# Patient Record
Sex: FEMALE | Race: ASIAN | NOT HISPANIC OR LATINO
[De-identification: names, ages, dates, MRNs, and addresses within clinical notes are randomized per-mention and may not be internally consistent; named-entity substitution may affect disease eponyms.]

---

## 2017-09-06 ENCOUNTER — FORM ENCOUNTER (OUTPATIENT)
Age: 47
End: 2017-09-06

## 2018-05-08 ENCOUNTER — FORM ENCOUNTER (OUTPATIENT)
Age: 48
End: 2018-05-08

## 2018-09-04 ENCOUNTER — FORM ENCOUNTER (OUTPATIENT)
Age: 48
End: 2018-09-04

## 2019-06-18 ENCOUNTER — FORM ENCOUNTER (OUTPATIENT)
Age: 49
End: 2019-06-18

## 2019-09-02 ENCOUNTER — FORM ENCOUNTER (OUTPATIENT)
Age: 49
End: 2019-09-02

## 2019-12-17 ENCOUNTER — FORM ENCOUNTER (OUTPATIENT)
Age: 49
End: 2019-12-17

## 2020-09-03 ENCOUNTER — FORM ENCOUNTER (OUTPATIENT)
Age: 50
End: 2020-09-03

## 2021-03-12 ENCOUNTER — APPOINTMENT (OUTPATIENT)
Dept: BREAST CENTER | Facility: CLINIC | Age: 51
End: 2021-03-12

## 2021-03-12 ENCOUNTER — NON-APPOINTMENT (OUTPATIENT)
Age: 51
End: 2021-03-12

## 2021-03-12 PROBLEM — Z00.00 ENCOUNTER FOR PREVENTIVE HEALTH EXAMINATION: Status: ACTIVE | Noted: 2021-03-12

## 2021-09-09 ENCOUNTER — NON-APPOINTMENT (OUTPATIENT)
Age: 51
End: 2021-09-09

## 2021-09-09 ENCOUNTER — APPOINTMENT (OUTPATIENT)
Dept: BREAST CENTER | Facility: CLINIC | Age: 51
End: 2021-09-09
Payer: COMMERCIAL

## 2021-09-09 VITALS
SYSTOLIC BLOOD PRESSURE: 121 MMHG | BODY MASS INDEX: 24.45 KG/M2 | WEIGHT: 138 LBS | DIASTOLIC BLOOD PRESSURE: 88 MMHG | HEIGHT: 63 IN

## 2021-09-09 DIAGNOSIS — N63.0 UNSPECIFIED LUMP IN UNSPECIFIED BREAST: ICD-10-CM

## 2021-09-09 PROCEDURE — 99213 OFFICE O/P EST LOW 20 MIN: CPT

## 2021-09-10 ENCOUNTER — TRANSCRIPTION ENCOUNTER (OUTPATIENT)
Age: 51
End: 2021-09-10

## 2022-03-21 ENCOUNTER — APPOINTMENT (OUTPATIENT)
Dept: BREAST CENTER | Facility: CLINIC | Age: 52
End: 2022-03-21
Payer: COMMERCIAL

## 2022-03-21 ENCOUNTER — NON-APPOINTMENT (OUTPATIENT)
Age: 52
End: 2022-03-21

## 2022-03-21 VITALS
WEIGHT: 142 LBS | BODY MASS INDEX: 25.16 KG/M2 | SYSTOLIC BLOOD PRESSURE: 124 MMHG | HEIGHT: 63 IN | HEART RATE: 86 BPM | DIASTOLIC BLOOD PRESSURE: 83 MMHG

## 2022-03-21 DIAGNOSIS — R92.8 OTHER ABNORMAL AND INCONCLUSIVE FINDINGS ON DIAGNOSTIC IMAGING OF BREAST: ICD-10-CM

## 2022-03-21 DIAGNOSIS — Z85.3 PERSONAL HISTORY OF MALIGNANT NEOPLASM OF BREAST: ICD-10-CM

## 2022-03-21 DIAGNOSIS — Z78.9 OTHER SPECIFIED HEALTH STATUS: ICD-10-CM

## 2022-03-21 PROCEDURE — 99214 OFFICE O/P EST MOD 30 MIN: CPT

## 2022-04-04 ENCOUNTER — NON-APPOINTMENT (OUTPATIENT)
Age: 52
End: 2022-04-04

## 2022-12-01 ENCOUNTER — NON-APPOINTMENT (OUTPATIENT)
Age: 52
End: 2022-12-01

## 2022-12-07 ENCOUNTER — NON-APPOINTMENT (OUTPATIENT)
Age: 52
End: 2022-12-07

## 2022-12-07 ENCOUNTER — APPOINTMENT (OUTPATIENT)
Dept: BREAST CENTER | Facility: CLINIC | Age: 52
End: 2022-12-07

## 2022-12-07 VITALS
DIASTOLIC BLOOD PRESSURE: 89 MMHG | HEIGHT: 63 IN | BODY MASS INDEX: 24.98 KG/M2 | SYSTOLIC BLOOD PRESSURE: 137 MMHG | HEART RATE: 108 BPM | WEIGHT: 141 LBS

## 2022-12-07 DIAGNOSIS — E11.9 TYPE 2 DIABETES MELLITUS W/OUT COMPLICATIONS: ICD-10-CM

## 2022-12-07 DIAGNOSIS — R92.2 INCONCLUSIVE MAMMOGRAM: ICD-10-CM

## 2022-12-07 PROCEDURE — 99214 OFFICE O/P EST MOD 30 MIN: CPT

## 2022-12-07 RX ORDER — METFORMIN ER 500 MG 500 MG/1
500 TABLET ORAL
Refills: 0 | Status: ACTIVE | COMMUNITY

## 2022-12-07 NOTE — HISTORY OF PRESENT ILLNESS
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 Fhx of breast cancer in sister, (dx age 38 and BRCA +). Patient is BRCA 1/2 negative (tested in 2011). Patient has not had updated genetic testing, she has declined at this time. Patient was due for MRI in 9/2022;however, declines proceeding with MRI's at this time. Patient denies palpable masses, skin changes, or nipple discharge bilaterally. \par  \par 9/7/17: B/l MG & US- scattered fibroglandular densities\par 9/15/18: B/l MG & US- no evidence of disease\par 9/3/19: B/l MG & US- merna Microcalc, stable L US masses\par 9/4/20: B/l MG & US- benign left breast masses and cysts, new 0.3 cm nodule retroareolar. Rec 6m f/u\par 3/12/21: L US- 0.3cm RA hypoechoic nodule, stable. BIRADS 3. \par 9/9/21: B/l MG & US-  Dense. L tissue marker. R postsurgical changes. US-  L 0.5cm 12:00, 5FN oval mass. L 0.3cm 2:00, 7FN hypoechoic mass. L RA 0.4cm hypoechoic mass like finding, possible duct w/ debris and unchanged in 2020. (Rec. 6 mo f/u). BIRADS 3. \par 3/21/22: MRI- R RANJITH. L 2.8cm clumped NME retroareolar 8:30 (rec MR bx). BI-RADS 4\par 3/21/22: L US- Stable 0.4cm hypoechoic nodule w/o significant change. Rec 6m f/u at time of annual imaging\par 3/30/22: L MR bx x3- SITE 1) L 7:00 retroareolar (infinity clip)-  benign breast tissue with sclerosing adenosis and usual ductal hyperplasia with rare calcifications. SITE 2) L 8:00 (buckle clip)- benign breast tissue with fibrocystic changes and sclerosing adenosis with calcification. SITE 3) L 7:00 posterior (cork clip)- benign breast tissue with sclerosing adenosis. Concordant. \par 9/21/22: B/l MG & US- heterogenously dense. US- L stable mass and cysts. BI-RADS 2

## 2022-12-07 NOTE — PHYSICAL EXAM
[de-identified] : R breast/axilla/supraclavicular area: No evidence of recurrence\par  [de-identified] : L breast/axilla/supraclavicular area: No masses, discharge, or adenopathy\par

## 2023-10-01 ENCOUNTER — NON-APPOINTMENT (OUTPATIENT)
Age: 53
End: 2023-10-01

## 2023-10-02 ENCOUNTER — NON-APPOINTMENT (OUTPATIENT)
Age: 53
End: 2023-10-02

## 2023-10-02 ENCOUNTER — APPOINTMENT (OUTPATIENT)
Dept: BREAST CENTER | Facility: CLINIC | Age: 53
End: 2023-10-02
Payer: COMMERCIAL

## 2023-10-02 VITALS
HEIGHT: 63 IN | HEART RATE: 88 BPM | SYSTOLIC BLOOD PRESSURE: 127 MMHG | DIASTOLIC BLOOD PRESSURE: 84 MMHG | BODY MASS INDEX: 23.57 KG/M2 | WEIGHT: 133 LBS

## 2023-10-02 DIAGNOSIS — Z80.3 FAMILY HISTORY OF MALIGNANT NEOPLASM OF BREAST: ICD-10-CM

## 2023-10-02 DIAGNOSIS — Z85.3 ENCOUNTER FOR FOLLOW-UP EXAMINATION AFTER COMPLETED TREATMENT FOR MALIGNANT NEOPLASM: ICD-10-CM

## 2023-10-02 DIAGNOSIS — Z08 ENCOUNTER FOR FOLLOW-UP EXAMINATION AFTER COMPLETED TREATMENT FOR MALIGNANT NEOPLASM: ICD-10-CM

## 2023-10-02 DIAGNOSIS — Z85.3 PERSONAL HISTORY OF MALIGNANT NEOPLASM OF BREAST: ICD-10-CM

## 2023-10-02 DIAGNOSIS — Z78.9 OTHER SPECIFIED HEALTH STATUS: ICD-10-CM

## 2023-10-02 PROCEDURE — 99213 OFFICE O/P EST LOW 20 MIN: CPT

## 2024-09-29 ENCOUNTER — NON-APPOINTMENT (OUTPATIENT)
Age: 54
End: 2024-09-29

## 2024-09-30 NOTE — PHYSICAL EXAM
[Normocephalic] : normocephalic [EOMI] : extra ocular movement intact [Supple] : supple [No Supraclavicular Adenopathy] : no supraclavicular adenopathy [No Cervical Adenopathy] : no cervical adenopathy [de-identified] : L breast/axilla/supraclavicular area: No masses, discharge, or adenopathy\par   [de-identified] : R breast/axilla/supraclavicular area: No evidence of recurrence\par

## 2024-09-30 NOTE — HISTORY OF PRESENT ILLNESS
[FreeTextEntry1] : Patient is a 53 yo F here for breast cancer surveillance. Hx of b/l nloc lump 1/2011 (age 41) yielding RIGHT 0.7cm DCIS (ER/[place]+) w/ negative margins and LEFT adenosis tumor. S/p XRT and Tamoxifen x 5 years. Fhx of breast cancer in sister, (dx age 38 and BRCA +). Patient is BRCA 1/2 negative (tested in 2011). Patient has declined updated genetic testing and additional high risk MRI imaging. Patient denies palpable masses, skin changes, or nipple discharge bilaterally.    9/7/17: B/l MG & US- scattered fibroglandular densities 9/15/18: B/l MG & US- no evidence of disease 9/3/19: B/l MG & US- merna Microcalc, stable L US masses 9/4/20: B/l MG & US- benign left breast masses and cysts, new 0.3 cm nodule retroareolar. Rec 6m f/u 3/12/21: L US- 0.3cm RA hypoechoic nodule, stable. BIRADS 3.  9/9/21: B/l MG & US-  Dense. L tissue marker. R postsurgical changes. US-  L 0.5cm 12:00, 5FN oval mass. L 0.3cm 2:00, 7FN hypoechoic mass. L RA 0.4cm hypoechoic mass like finding, possible duct w/ debris and unchanged in 2020. (Rec. 6 mo f/u). BIRADS 3.  3/21/22: MRI- R RANJITH. L 2.8cm clumped NME retroareolar 8:30 (rec MR bx). BI-RADS 4 3/21/22: L US- Stable 0.4cm hypoechoic nodule w/o significant change. Rec 6m f/u at time of annual imaging 3/30/22: L MR bx x3- SITE 1) L 7:00 retroareolar (infinity clip)-  benign breast tissue with sclerosing adenosis and usual ductal hyperplasia with rare calcifications. SITE 2) L 8:00 (buckle clip)- benign breast tissue with fibrocystic changes and sclerosing adenosis with calcification. SITE 3) L 7:00 posterior (cork clip)- benign breast tissue with sclerosing adenosis. Concordant.  9/21/22: B/l MG & US- heterogeneously dense. US- L stable mass and cysts. BI-RADS 2 10/2/23: B/l MG & US- heterogeneously dense, b/l post surgical changes, US- L stable masses and cysts. BIRADS 2.  10/10/24 B/L MG&US-scheduled

## 2024-09-30 NOTE — PLAN
[TextEntry] : Reviewed imaging findings and discussed results with patient. She is to return in 1 year for annual B/l MG & US and office visit.

## 2024-09-30 NOTE — PAST MEDICAL HISTORY
[Menarche Age ____] : age at menarche was [unfilled] [Menopause Age____] : age at menopause was [unfilled] [History of Hormone Replacement Treatment] : has no history of hormone replacement treatment [Total Preg ___] : G[unfilled] [Age At Live Birth ___] : Age at live birth: [unfilled]

## 2024-10-10 ENCOUNTER — APPOINTMENT (OUTPATIENT)
Dept: BREAST CENTER | Facility: CLINIC | Age: 54
End: 2024-10-10
Payer: COMMERCIAL

## 2024-10-10 VITALS
WEIGHT: 137 LBS | HEART RATE: 106 BPM | HEIGHT: 63 IN | SYSTOLIC BLOOD PRESSURE: 134 MMHG | BODY MASS INDEX: 24.27 KG/M2 | DIASTOLIC BLOOD PRESSURE: 84 MMHG

## 2024-10-10 DIAGNOSIS — Z08 ENCOUNTER FOR FOLLOW-UP EXAMINATION AFTER COMPLETED TREATMENT FOR MALIGNANT NEOPLASM: ICD-10-CM

## 2024-10-10 DIAGNOSIS — Z85.3 PERSONAL HISTORY OF MALIGNANT NEOPLASM OF BREAST: ICD-10-CM

## 2024-10-10 DIAGNOSIS — Z85.3 ENCOUNTER FOR FOLLOW-UP EXAMINATION AFTER COMPLETED TREATMENT FOR MALIGNANT NEOPLASM: ICD-10-CM

## 2024-10-10 PROCEDURE — 99213 OFFICE O/P EST LOW 20 MIN: CPT
